# Patient Record
Sex: MALE | Race: WHITE | NOT HISPANIC OR LATINO | Employment: FULL TIME | URBAN - METROPOLITAN AREA
[De-identification: names, ages, dates, MRNs, and addresses within clinical notes are randomized per-mention and may not be internally consistent; named-entity substitution may affect disease eponyms.]

---

## 2024-05-01 ENCOUNTER — OFFICE VISIT (OUTPATIENT)
Dept: URGENT CARE | Facility: CLINIC | Age: 34
End: 2024-05-01

## 2024-05-01 VITALS
BODY MASS INDEX: 28.79 KG/M2 | RESPIRATION RATE: 18 BRPM | SYSTOLIC BLOOD PRESSURE: 121 MMHG | HEART RATE: 97 BPM | WEIGHT: 190 LBS | DIASTOLIC BLOOD PRESSURE: 76 MMHG | TEMPERATURE: 100 F | HEIGHT: 68 IN | OXYGEN SATURATION: 95 %

## 2024-05-01 DIAGNOSIS — R50.9 FEVER, UNSPECIFIED FEVER CAUSE: ICD-10-CM

## 2024-05-01 DIAGNOSIS — J01.90 ACUTE SINUSITIS WITH SYMPTOMS GREATER THAN 10 DAYS: Primary | ICD-10-CM

## 2024-05-01 DIAGNOSIS — R05.9 COUGH, UNSPECIFIED TYPE: ICD-10-CM

## 2024-05-01 LAB
CTP QC/QA: YES
SARS-COV-2 AG RESP QL IA.RAPID: NEGATIVE

## 2024-05-01 PROCEDURE — 99203 OFFICE O/P NEW LOW 30 MIN: CPT | Mod: S$GLB,,, | Performed by: NURSE PRACTITIONER

## 2024-05-01 PROCEDURE — 87811 SARS-COV-2 COVID19 W/OPTIC: CPT | Mod: QW,S$GLB,, | Performed by: NURSE PRACTITIONER

## 2024-05-01 RX ORDER — AZELASTINE 1 MG/ML
1 SPRAY, METERED NASAL 2 TIMES DAILY
Qty: 30 ML | Refills: 0 | Status: SHIPPED | OUTPATIENT
Start: 2024-05-01 | End: 2025-05-01

## 2024-05-01 RX ORDER — AMOXICILLIN AND CLAVULANATE POTASSIUM 875; 125 MG/1; MG/1
1 TABLET, FILM COATED ORAL 2 TIMES DAILY
Qty: 20 TABLET | Refills: 0 | Status: SHIPPED | OUTPATIENT
Start: 2024-05-01 | End: 2024-05-11

## 2024-05-01 RX ORDER — PROMETHAZINE HYDROCHLORIDE AND DEXTROMETHORPHAN HYDROBROMIDE 6.25; 15 MG/5ML; MG/5ML
5 SYRUP ORAL EVERY 8 HOURS PRN
Qty: 118 ML | Refills: 0 | Status: SHIPPED | OUTPATIENT
Start: 2024-05-01 | End: 2024-05-09

## 2024-05-01 RX ORDER — LEVOCETIRIZINE DIHYDROCHLORIDE 5 MG/1
5 TABLET, FILM COATED ORAL NIGHTLY
Qty: 30 TABLET | Refills: 11 | Status: SHIPPED | OUTPATIENT
Start: 2024-05-01 | End: 2025-05-01

## 2024-05-01 NOTE — PROGRESS NOTES
"Subjective:      Patient ID: True Garibay is a 34 y.o. male.    Vitals:  height is 5' 8" (1.727 m) and weight is 86.2 kg (190 lb). His temperature is 100 °F (37.8 °C). His blood pressure is 121/76 and his pulse is 97. His respiration is 18 and oxygen saturation is 95%.     Chief Complaint: Cough    35 y/o male presents with a complaint of body aches and fever cough, sinus pain, sinus pressure, fatigue, and nausea.  Reports he thought he was getting better, symptoms returned.  Onset of symptoms, two weeks..     Cough  This is a new problem. The current episode started 1 to 4 weeks ago. The problem has been gradually worsening. The problem occurs constantly. The cough is Non-productive. Associated symptoms include chills, ear congestion, a fever, headaches, nasal congestion, postnasal drip, a sore throat and sweats. Pertinent negatives include no chest pain, ear pain, heartburn, hemoptysis, myalgias, rash, rhinorrhea, shortness of breath, weight loss or wheezing. Nothing aggravates the symptoms. He has tried nothing for the symptoms. There is no history of asthma, bronchitis, environmental allergies or pneumonia.       Constitution: Positive for chills, fatigue and fever. Negative for sweating and generalized weakness.   HENT:  Positive for congestion, postnasal drip, sinus pain, sinus pressure and sore throat. Negative for ear pain, ear discharge, foreign body in ear, tinnitus, facial swelling and trouble swallowing.    Cardiovascular:  Negative for chest pain and palpitations.   Respiratory:  Positive for cough. Negative for chest tightness, bloody sputum, shortness of breath, stridor, wheezing and asthma.    Gastrointestinal:  Negative for nausea, vomiting and heartburn.   Musculoskeletal:  Negative for muscle ache.   Skin:  Negative for rash.   Allergic/Immunologic: Negative for environmental allergies and asthma.   Neurological:  Positive for headaches and history of migraines. Negative for dizziness and " light-headedness.      Objective:     Physical Exam   Constitutional: He is oriented to person, place, and time. He appears well-developed. He is cooperative.  Non-toxic appearance. He does not appear ill. No distress.   HENT:   Head: Normocephalic and atraumatic.   Ears:   Right Ear: Hearing, tympanic membrane, external ear and ear canal normal. no impacted cerumen  Left Ear: Hearing, tympanic membrane, external ear and ear canal normal. no impacted cerumen  Nose: Mucosal edema, rhinorrhea, sinus tenderness and congestion present. No nasal deformity. No epistaxis. Right sinus exhibits frontal sinus tenderness. Right sinus exhibits no maxillary sinus tenderness. Left sinus exhibits frontal sinus tenderness. Left sinus exhibits no maxillary sinus tenderness.   Mouth/Throat: Uvula is midline and mucous membranes are normal. No trismus in the jaw. Normal dentition. No uvula swelling. Posterior oropharyngeal erythema present. No oropharyngeal exudate, posterior oropharyngeal edema, tonsillar abscesses or cobblestoning. No tonsillar exudate.   Eyes: Conjunctivae and lids are normal. No scleral icterus.   Neck: Trachea normal and phonation normal. Neck supple. No edema present. No erythema present. No neck rigidity present.   Cardiovascular: Normal rate, regular rhythm, normal heart sounds and normal pulses.   Pulmonary/Chest: Effort normal and breath sounds normal. No respiratory distress. He has no decreased breath sounds. He has no rhonchi.   Abdominal: Normal appearance.   Musculoskeletal: Normal range of motion.         General: No deformity. Normal range of motion.   Neurological: He is alert and oriented to person, place, and time. He exhibits normal muscle tone. Coordination normal.   Skin: Skin is warm, dry, intact, not diaphoretic and not pale.   Psychiatric: His speech is normal and behavior is normal. Judgment and thought content normal.   Nursing note and vitals reviewed.      Assessment:     1. Acute  sinusitis with symptoms greater than 10 days    2. Fever, unspecified fever cause    3. Cough, unspecified type      Results for orders placed or performed in visit on 05/01/24   SARS Coronavirus 2 Antigen, POCT Manual Read   Result Value Ref Range    SARS Coronavirus 2 Antigen Negative Negative     Acceptable Yes         Plan:     Acute sinusitis with symptoms greater than 10 days  -     azelastine (ASTELIN) 137 mcg (0.1 %) nasal spray; 1 spray (137 mcg total) by Nasal route 2 (two) times daily.  Dispense: 30 mL; Refill: 0  -     levocetirizine (XYZAL) 5 MG tablet; Take 1 tablet (5 mg total) by mouth every evening.  Dispense: 30 tablet; Refill: 11  -     amoxicillin-clavulanate 875-125mg (AUGMENTIN) 875-125 mg per tablet; Take 1 tablet by mouth 2 (two) times daily. for 10 days  Dispense: 20 tablet; Refill: 0    Fever, unspecified fever cause  -     SARS Coronavirus 2 Antigen, POCT Manual Read    Cough, unspecified type  -     promethazine-dextromethorphan (PROMETHAZINE-DM) 6.25-15 mg/5 mL Syrp; Take 5 mLs by mouth every 8 (eight) hours as needed (cough).  Dispense: 118 mL; Refill: 0              You can try breathe right strips at night to help you breathe.  A cool mist humidifier in bedroom may help with cough and relieve stuffy nose.     Sore throat:  Lozenge, hard candy or honey.      Sinus rinses DO NOT USE TAP WATER, if you must, water must be a rolling boil for 1 minute, let it cool, then use.  May use distilled water, or over the counter nasal saline rinses.  Vics vapor rub in shower to help open nasal passages.  May use nasal gel to keep passages moisturized.  May use Nasal saline sprays during the day for added relief of congestion.   For those who go to the gym, please do not use the sauna or steam room now to clear sinuses.    During pollen season, change shirt if you are outside for a while when you go in.  Also wash your face.  Do not touch your face with your hands.  Wash your hands  often in general while ill, avoid face contact with hands.     Over the counter you can use Tylenol (acetominophen) or Ibuprofen for your minor aches and pains as long as you have no contraindications.    Good nutrition. Lots of rest. Plenty of fluids       You must understand that you've received an Urgent Care treatment only and that you may be released before all your medical problems are known or treated. You, the patient, will arrange for follow up care as instructed.  Follow up with your PCP or specialty clinic as directed in the next 1-2 weeks if not improved or as needed.  You can call [phone] to schedule an appointment with the appropriate provider.  If your condition worsens we recommend that you receive another evaluation at the emergency room immediately or contact your primary medical clinics after hours call service to discuss your concerns.  Please return here or go to the Emergency Department for any concerns or worsening of condition.    If you were prescribed a narcotic or controlled medication, do not drive or operate heavy equipment or machinery while taking these medications.    Thank you for choosing [facility]!

## 2024-05-02 NOTE — PATIENT INSTRUCTIONS
You can try breathe right strips at night to help you breathe.  A cool mist humidifier in bedroom may help with cough and relieve stuffy nose.     Sore throat:  Lozenge, hard candy or honey.      Sinus rinses DO NOT USE TAP WATER, if you must, water must be a rolling boil for 1 minute, let it cool, then use.  May use distilled water, or over the counter nasal saline rinses.  Vics vapor rub in shower to help open nasal passages.  May use nasal gel to keep passages moisturized.  May use Nasal saline sprays during the day for added relief of congestion.   For those who go to the gym, please do not use the sauna or steam room now to clear sinuses.    During pollen season, change shirt if you are outside for a while when you go in.  Also wash your face.  Do not touch your face with your hands.  Wash your hands often in general while ill, avoid face contact with hands.     Over the counter you can use Tylenol (acetominophen) or Ibuprofen for your minor aches and pains as long as you have no contraindications.    Good nutrition. Lots of rest. Plenty of fluids       You must understand that you've received an Urgent Care treatment only and that you may be released before all your medical problems are known or treated. You, the patient, will arrange for follow up care as instructed.  Follow up with your PCP or specialty clinic as directed in the next 1-2 weeks if not improved or as needed.  You can call [phone] to schedule an appointment with the appropriate provider.  If your condition worsens we recommend that you receive another evaluation at the emergency room immediately or contact your primary medical clinics after hours call service to discuss your concerns.  Please return here or go to the Emergency Department for any concerns or worsening of condition.    If you were prescribed a narcotic or controlled medication, do not drive or operate heavy equipment or machinery while taking these medications.    Thank  you for choosing [facility]!    Our goal in the Urgent Care is to always provide outstanding medical care. You may receive a survey by mail or e-mail in the next week regarding your experience today. We would greatly appreciate you completing and returning the survey. Your feedback provides us with a way to recognize our staff who provide very good care, and it helps us learn how to improve when your experience was below our aspiration of excellence.      We appreciate you trusting us with your medical care. We hope you feel better soon. We will be happy to take care of you for all of your future medical needs.   This note was prepared using voice-recognition software.  Although efforts are made to proofread the note, some errors may persist in the final document.     Sincerely,    Jhon Llamas DNP, FNP-C

## 2025-02-02 ENCOUNTER — OFFICE VISIT (OUTPATIENT)
Dept: URGENT CARE | Facility: CLINIC | Age: 35
End: 2025-02-02

## 2025-02-02 ENCOUNTER — HOSPITAL ENCOUNTER (EMERGENCY)
Facility: OTHER | Age: 35
Discharge: HOME | End: 2025-02-02
Attending: EMERGENCY MEDICINE

## 2025-02-02 VITALS
OXYGEN SATURATION: 98 % | RESPIRATION RATE: 19 BRPM | WEIGHT: 203.69 LBS | HEIGHT: 68 IN | SYSTOLIC BLOOD PRESSURE: 148 MMHG | HEART RATE: 76 BPM | BODY MASS INDEX: 30.87 KG/M2 | TEMPERATURE: 98 F | DIASTOLIC BLOOD PRESSURE: 90 MMHG

## 2025-02-02 VITALS
OXYGEN SATURATION: 96 % | HEART RATE: 105 BPM | DIASTOLIC BLOOD PRESSURE: 93 MMHG | SYSTOLIC BLOOD PRESSURE: 145 MMHG | HEIGHT: 68 IN | WEIGHT: 200 LBS | BODY MASS INDEX: 30.31 KG/M2 | TEMPERATURE: 98 F | RESPIRATION RATE: 20 BRPM
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
## 2025-02-02 PROCEDURE — 90471 IMMUNIZATION ADMIN: CPT | Performed by: NURSE PRACTITIONER

## 2025-02-02 PROCEDURE — 25000003 PHARM REV CODE 250: Performed by: NURSE PRACTITIONER

## 2025-02-02 PROCEDURE — 63600175 PHARM REV CODE 636 W HCPCS: Mod: JZ,TB | Performed by: NURSE PRACTITIONER

## 2025-02-02 PROCEDURE — 90471 IMMUNIZATION ADMIN: CPT | Mod: S$GLB,,, | Performed by: FAMILY MEDICINE

## 2025-02-02 PROCEDURE — 90675 RABIES VACCINE IM: CPT | Mod: JZ,TB | Performed by: NURSE PRACTITIONER

## 2025-02-02 PROCEDURE — 90375 RABIES IG IM/SC: CPT | Mod: JZ,TB | Performed by: NURSE PRACTITIONER

## 2025-02-02 PROCEDURE — 99284 EMERGENCY DEPT VISIT MOD MDM: CPT | Mod: 25

## 2025-02-02 PROCEDURE — 90715 TDAP VACCINE 7 YRS/> IM: CPT | Mod: S$GLB,,, | Performed by: FAMILY MEDICINE

## 2025-02-02 PROCEDURE — 99213 OFFICE O/P EST LOW 20 MIN: CPT | Mod: 25,S$GLB,, | Performed by: FAMILY MEDICINE

## 2025-02-02 RX ORDER — ONDANSETRON 4 MG/1
4 TABLET, ORALLY DISINTEGRATING ORAL EVERY 8 HOURS PRN
Qty: 15 TABLET | Refills: 0 | Status: SHIPPED | OUTPATIENT
Start: 2025-02-02 | End: 2025-02-07

## 2025-02-02 RX ORDER — AMOXICILLIN AND CLAVULANATE POTASSIUM 875; 125 MG/1; MG/1
1 TABLET, FILM COATED ORAL 2 TIMES DAILY
Qty: 14 TABLET | Refills: 0 | Status: SHIPPED | OUTPATIENT
Start: 2025-02-02 | End: 2025-02-09

## 2025-02-02 RX ORDER — ONDANSETRON 4 MG/1
4 TABLET, ORALLY DISINTEGRATING ORAL
Status: COMPLETED | OUTPATIENT
Start: 2025-02-02 | End: 2025-02-02

## 2025-02-02 RX ADMIN — RABIES IMMUNE GLOBULIN (HUMAN) 1800 UNITS: 300 INJECTION, SOLUTION INFILTRATION; INTRAMUSCULAR at 01:02

## 2025-02-02 RX ADMIN — RABIES VACCINE 2.5 UNITS: KIT at 01:02

## 2025-02-02 RX ADMIN — ONDANSETRON 4 MG: 4 TABLET, ORALLY DISINTEGRATING ORAL at 01:02

## 2025-02-02 NOTE — ED TRIAGE NOTES
Pt arrived to ED complaining of cat bites and scratches to L wrist forearm and thumb last night. Minor scratches noted to wrist and thumb.

## 2025-02-02 NOTE — ED PROVIDER NOTES
"Source of History:  Patient, Chart    Chief complaint:  Animal Bite (Pt reports multiple bites to L wrist/forearm and L thumb from stray cat last night. Pt seen at  and sent to ED for rabies vaccine)      HPI:  True Garibay is a 34 y.o. male with no PMH. He was sent to the ED from Urgent Care for a rabies vaccine. Last night he was bitten and scratched multiple times on his right wrist and right thumb by a stray cat.  He was superficial scratch/bite marks with no edema, erythema, or drainage. He has no pain. He received the tetanus vaccine at .     This is the extent to the patients complaints today here in the emergency department.    ROS: As per HPI and below:  Constitutional: No fever.  No chills.  Eyes: No visual changes.   ENT: No sore throat. No ear pain.  Urinary: No abnormal urination.  MSK: No back pain. No joint pain.   Integument:  Positive for lacerations    Review of patient's allergies indicates:  No Known Allergies    PMH:  As per HPI and below:  History reviewed. No pertinent past medical history.  History reviewed. No pertinent surgical history.    Social History     Tobacco Use    Smoking status: Every Day     Current packs/day: 1.00     Average packs/day: 1 pack/day for 15.1 years (15.1 ttl pk-yrs)     Types: Cigarettes     Start date: 2010    Smokeless tobacco: Never   Substance Use Topics    Alcohol use: Yes    Drug use: Never       Physical Exam:    BP (!) 145/93   Pulse 105   Temp 98.3 °F (36.8 °C) (Oral)   Resp 20   Ht 5' 8" (1.727 m)   Wt 90.7 kg (200 lb)   SpO2 96%   BMI 30.41 kg/m²   Nursing note and vital signs reviewed.  Constitutional: No acute distress.  Nontoxic  Eyes: No conjunctival injection.  Extraocular muscles are intact.  ENT: Normal phonation.  Musculoskeletal: Good range of motion all joints.  No deformities.  Neck supple.  No meningismus. Neurovascularly intact.  Skin:  right thumb and right wrist superficial scratch/bite marks.  no edema, erythema, or " drainage  Psych: Appropriate, conversant.    [facility]    Emergent evaluation of a 33 yo male presenting for a post exposure prophylactic vaccine.  Patient states that he was bit and scratched by a stray cat last night.  Patient went to urgent care and was started on Augmentin as well as given a tetanus shot.  Patient states that he was advised to come to the ED for evaluation and possible rabies vaccines.  Patient states he was online Googling all night and wants to vaccines despite research showing that low risk for rabies in domestic animals.  Patient states I notes over kill but I wanted any way.  On exam pt is A&Ox3. VSS. Nonfebrile and nontoxic appearing. Patient is not having any pain.   Superficial lacerations/abrasions noted to right thumb and wrist.  No redness, erythema or drainage noted.    History Acquisition   Additional history was acquired from other historians.  Chart    The patient's list of active medical problems, social history, medications, and allergies as documented per RN staff has been reviewed.     Differential Diagnoses   Based on available information and the initial assessment, the working differential diagnoses considered during this evaluation include but are not limited to laceration, contusion, scratches, cat bite, rabies exposure, others.    I will give rabies injections and reassess.      LABS   Labs Reviewed - No data to display    Additional Consideration   All available testing was considered during the course of this workup.  Comorbidities taken into consideration during the patient's evaluation and treatment include weight, age.    Social determinants of health were taken into consideration during development of our treatment plan.    Medications   rabies vaccine, PCEC injection 2.5 Units (has no administration in time range)   rabies immune globulin (PF) (HYPERRAB) injection 1,800 Units (has no administration in time range)   ondansetron disintegrating tablet 4 mg (has no  administration in time range)      ED Course as of 02/02/25 1308   Sun Feb 02, 2025   1307 Patient given rabies immuno globulin into 5 separate superficial laceration sites.  Remaining medications have been given IM.  Patient also received his 1st IM injection of rabies vaccine.  Advised to follow up on day 3, 7 and 14 for repeat imaging.  Will attempt to have patient followed at infusion center but patient advised if he is unable to return to the ED. advised to rotate Tylenol and ibuprofen as needed for pain.  Take antibiotics as prescribed.  Zofran for nausea.  Keep area clean and dry and monitor for any signs of infection.  Return precautions discussed.  Patient verbalized understanding of this plan of care.  All questions and concerns addressed. [RZ]      ED Course User Index  [RZ] Barbara Oconnor NP             CLINICAL IMPRESSION  1. Cat bite, initial encounter         ED Disposition Condition    Discharge Stable            Instruction:  I see no indication of an emergent process beyond that addressed during our encounter but have duly counseled the patient/family regarding the need for prompt follow-up as well as the indications that should prompt immediate return to the emergency room should new or worrisome developments occur.  The patient/family has been provided with verbal and printed direction regarding our final diagnosis(es) as well as instructions regarding use of OTC and/or Rx medications intended to manage the patient's aforementioned conditions including:  ED Prescriptions       Medication Sig Dispense Start Date End Date Auth. Provider    ondansetron (ZOFRAN-ODT) 4 MG TbDL Take 1 tablet (4 mg total) by mouth every 8 (eight) hours as needed (nausea). 15 tablet 2/2/2025 2/7/2025 Barbara Oconnor NP          Patient has been advised of following recommended follow-up instructions:  Follow-up Information       Follow up With Specialties Details Why Contact Info    PCP  Schedule an appointment as  soon as possible for a visit  As needed     [facility]  Call  to schedule outpatient follow up [address] [facility]          The patient/family communicates understanding of all this information and all remaining questions and concerns were addressed at this time.      The patient's condition did not warrant review of the  and prescription of controlled substances.      This note was created using dictation software.  This program may occasionally mistype words and phrases.        Barbara Oconnor, NP  02/02/25 [id number]

## 2025-02-02 NOTE — DISCHARGE INSTRUCTIONS
Please return to the ED on 2/5/25, 2/9/25 and 2/16/25 for your repeat rabies vaccines if you marianne unable to obtain them at the infusion center.      Please take your antibiotics as prescribed.  Make sure you eat when you take your medications.  We have prescribed you Zofran for nausea.  Rotate Tylenol and ibuprofen as needed for pain.  Monitor for any signs of infection.    Our goal in the emergency department is to always give you outstanding care and exceptional service. You may receive a survey by mail or e-mail in the next week regarding your experience in our ED. We would greatly appreciate your completing and returning the survey. Your feedback provides us with a way to recognize our staff who give very good care and it helps us learn how to improve when your experience was below our aspiration of excellence.

## 2025-02-02 NOTE — PROGRESS NOTES
"Subjective:      Patient ID: True Garibay is a 34 y.o. male.    Vitals:  height is 5' 8" (1.727 m) and weight is 92.4 kg (203 lb 11.3 oz). His oral temperature is 97.7 °F (36.5 °C). His blood pressure is 148/90 (abnormal) and his pulse is 76. His respiration is 19 and oxygen saturation is 98%.     Chief Complaint: Animal Bite    Patient presents with cat bite.  States he was bitten by a stray cat last night.  Last tetanus vaccination unknown.    Animal Bite   The incident occurred yesterday. The incident occurred in the street. He came to the ER via personal transport. There is an injury to the Right wrist and right hand. There is an injury to the Right thumb. The pain is moderate. He is Right-handed. His tetanus status is out of date (NEVER HAD ONE).     ROS   Objective:     Physical Exam   Constitutional: He is oriented to person, place, and time. He appears well-developed.   HENT:   Head: Normocephalic and atraumatic. Head is without abrasion, without contusion and without laceration.   Ears:   Right Ear: External ear normal.   Left Ear: External ear normal.   Nose: Nose normal.   Mouth/Throat: Oropharynx is clear and moist and mucous membranes are normal.   Eyes: Conjunctivae, EOM and lids are normal. Pupils are equal, round, and reactive to light.   Neck: Trachea normal and phonation normal. Neck supple.   Cardiovascular: Normal rate, regular rhythm and normal heart sounds.   No murmur heard.  Pulmonary/Chest: Effort normal and breath sounds normal. No stridor. No respiratory distress. He has no wheezes. He has no rhonchi. He has no rales.   Abdominal: He exhibits no distension. There is no abdominal tenderness.   Musculoskeletal: Normal range of motion.         General: Normal range of motion.   Neurological: He is alert and oriented to person, place, and time.   Skin: Skin is warm, dry and intact. Capillary refill takes less than 2 seconds. No abrasion, No burn and No ecchymosis         Comments:   Right " hand/forearm:  Positive minor abrasions on forearm and hand.  No open wounds found.  Full ROM.  Neurovascular intact.    Area cleaned with Betadine and alcohol.  Band-Aids applied.   Psychiatric: His speech is normal and behavior is normal. Judgment and thought content normal.   Nursing note and vitals reviewed.      Assessment:     1. Cat bite, initial encounter        Plan:   Discussed exam findings/diagnosis/plan with patient.  Tdap given.  Rabies vaccination discussed considering the stray cat. Pt would like to get it from [facility].  Advised to f/u with PCP within 2-5 days. All questions answered. Patient verbally understood and agreed with treatment plan.  Educational materials and instructions regarding the visit diagnosis and management provided.     Cat bite, initial encounter  -     Tdap vaccine injection 0.5 mL    Other orders  -     amoxicillin-clavulanate 875-125mg (AUGMENTIN) 875-125 mg per tablet; Take 1 tablet by mouth 2 (two) times daily. for 7 days  Dispense: 14 tablet; Refill: 0

## 2025-02-06 ENCOUNTER — HOSPITAL ENCOUNTER (EMERGENCY)
Facility: OTHER | Age: 35
Discharge: HOME | End: 2025-02-06
Attending: EMERGENCY MEDICINE

## 2025-02-06 ENCOUNTER — TELEPHONE (OUTPATIENT)
Dept: INFECTIOUS DISEASES | Facility: CLINIC | Age: 35
End: 2025-02-06

## 2025-02-06 VITALS
DIASTOLIC BLOOD PRESSURE: 84 MMHG | WEIGHT: 200 LBS | BODY MASS INDEX: 30.31 KG/M2 | OXYGEN SATURATION: 96 % | RESPIRATION RATE: 18 BRPM | HEART RATE: 109 BPM | HEIGHT: 68 IN | SYSTOLIC BLOOD PRESSURE: 140 MMHG | TEMPERATURE: 99 F

## 2025-02-06 DIAGNOSIS — S61.452A CAT BITE OF HAND, LEFT, INITIAL ENCOUNTER: Primary | ICD-10-CM
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
## 2025-02-06 DIAGNOSIS — Z23 ENCOUNTER FOR REPEAT ADMINISTRATION OF RABIES VACCINATION: ICD-10-CM

## 2025-02-06 PROCEDURE — 90471 IMMUNIZATION ADMIN: CPT | Performed by: NURSE PRACTITIONER

## 2025-02-06 PROCEDURE — 90675 RABIES VACCINE IM: CPT | Mod: JZ,TB | Performed by: NURSE PRACTITIONER

## 2025-02-06 PROCEDURE — 63600175 PHARM REV CODE 636 W HCPCS: Mod: JZ,TB | Performed by: NURSE PRACTITIONER

## 2025-02-06 PROCEDURE — 99999 HC NO LEVEL OF SERVICE - ED ONLY: CPT

## 2025-02-06 RX ADMIN — RABIES VACCINE 2.5 UNITS: KIT at 02:02

## 2025-02-06 NOTE — ED TRIAGE NOTES
Pt. Is a 34 yr. Old male. Pt. Is here to receive second dose of Rabies immunization. Pt. First dose was Sunday. Pt. Is alert and ABC's are intact. Pt. Has no other complaints.

## 2025-02-06 NOTE — ED NOTES
Spoke w/ ID clinic philip Valenzuela for remaining Rabies vaccine scheduled ( 2/13 @10AM, 2/14 @10AM, 2/21 @ 1PM).  Patient advised of date/time and clinic location.  Patient expressed verbal understanding and does not have questions at this time.

## 2025-02-06 NOTE — ED PROVIDER NOTES
"Source of History:  Patient, chart    Chief complaint:  Immunizations (Pt here for second dose of rabies vaccine. Pt states first dose Sunday. )      HPI:  True Garibay is a 34 y.o. male with no PMH presenting for second rabies vaccine.  Pt states he was bit by a cat on Saturday.  Patient was seen at urgent care and sent to the ED for rabies vaccination.  Patient was updated that they are not necessary at this time but patient was adamant he wanted them.  Patient is here for 2nd immunization.  Patient denies any complaints since last visit.     This is the extent to the patients complaints today here in the emergency department.    ROS: As per HPI and below:  Constitutional: No fever.  No chills.  Eyes: No visual changes.   ENT: No sore throat. No ear pain.  Urinary: No abnormal urination.  MSK: No back pain. No joint pain.   Integument: Positive for cat bites    Review of patient's allergies indicates:  No Known Allergies    PMH:  As per HPI and below:  No past medical history on file.  No past surgical history on file.    Social History     Tobacco Use    Smoking status: Every Day     Current packs/day: 1.00     Average packs/day: 1 pack/day for 15.1 years (15.1 ttl pk-yrs)     Types: Cigarettes     Start date: 2010    Smokeless tobacco: Never   Substance Use Topics    Alcohol use: Yes    Drug use: Never       Physical Exam:    BP (!) 140/84 (BP Location: Left arm)   Pulse 109   Temp 98.8 °F (37.1 °C) (Oral)   Resp 18   Ht 5' 8" (1.727 m)   Wt 90.7 kg (200 lb)   SpO2 96%   BMI 30.41 kg/m²   Nursing note and vital signs reviewed.  Constitutional: No acute distress.  Nontoxic  Eyes: No conjunctival injection. Extraocular muscles intact.  ENT: Normal phonation.  Musculoskeletal: Good range of motion all joints.  No deformities.  Neck supple.  No meningismus. Neurovascularly intact.  Skin:  Healed cat bites noted to right forearm.  No redness, erythema or drainage noted.  No rashes seen.  Good turgor.  No " abrasions.  No ecchymoses.  Psych:  Alert and oriented x 3.  Appropriate, conversant.    MDM    Emergent evaluation of a 35 yo male presenting for rabies vaccinations.  Patient was bit by a cat on Saturday and was here for his 1st rabies vaccine on Sunday.  Patient was unable to get follow up in the outpatient clinic so he came back to the ED for his 2nd round of shots.  Patient denies any complaints.  On exam pt is A&Ox3. VSS. Nonfebrile and nontoxic appearing.  Mucous membranes pink and moist. Pt speaking in full sentences. Healed cat bites noted to right forearm.  No redness, erythema or drainage noted.  Steady gait appreciated. Cap refill < 3 seconds.      History Acquisition   Additional history was acquired from other historians.  Chart    The patient's list of active medical problems, social history, medications, and allergies as documented per RN staff has been reviewed.     Differential Diagnoses   Based on available information and the initial assessment, the working differential diagnoses considered during this evaluation include but are not limited to rabies vaccine, cellulitis, infection, others.    I will give 2nd vaccine and discharged home.      Additional Consideration   All available testing was considered during the course of this workup.  Comorbidities taken into consideration during the patient's evaluation and treatment include weight, age.    Social determinants of health were taken into consideration during development of our treatment plan.    Medications   rabies vaccine, PCEC injection 2.5 Units (2.5 Units Intramuscular Given 2/6/25 1435)      ED Course as of 02/06/25 1441   Thu Feb 06, 2025   1420 Rabies immunizations given.  Referral for infectious disease placed.  Patient advised to return to the ED for 3rd shot if unable to follow up at a at Infectious Disease.  Patient verbalized understanding of this plan of care.  All questions and concerns addressed. [RZ]   1217 Patient is  hemodynamically stable, vital signs are normal. Discharge instructions given. Return to ED precautions discussed. Follow up as directed. Pt verbalized understanding of this plan.  Pt is stable for discharge.  [RZ]      ED Course User Index  [RZ] Barbara Oconnor NP             CLINICAL IMPRESSION  1. Cat bite, subsequent encounter    2. Encounter for repeat administration of rabies vaccination         ED Disposition Condition    Discharge Stable            Instruction:  I see no indication of an emergent process beyond that addressed during our encounter but have duly counseled the patient/family regarding the need for prompt follow-up as well as the indications that should prompt immediate return to the emergency room should new or worrisome developments occur.  The patient/family has been provided with verbal and printed direction regarding our final diagnosis(es) as well as instructions regarding use of OTC and/or Rx medications intended to manage the patient's aforementioned conditions including:  ED Prescriptions    None       Patient has been advised of following recommended follow-up instructions:  Follow-up Information       Follow up With Specialties Details Why Contact Info    [facility] INFECTIOUS DISE[facility] Infectious Diseases Schedule an appointment as soon as possible for a visit  for repeat shots [address] [place] [id number] [phone]          The patient/family communicates understanding of all this information and all remaining questions and concerns were addressed at this time.      The patient's condition did not warrant review of the  and prescription of controlled substances.      This note was created using dictation software.  This program may occasionally mistype words and phrases.         Barbara Oconnor, JAMSHID  02/06/25 1440       Barbara Oconnor NP  02/06/25 1441

## 2025-02-10 ENCOUNTER — CLINICAL SUPPORT (OUTPATIENT)
Dept: INFECTIOUS DISEASES | Facility: CLINIC | Age: 35
End: 2025-02-10
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
## 2025-02-10 PROCEDURE — 90471 IMMUNIZATION ADMIN: CPT | Mod: PBBFAC

## 2025-02-10 PROCEDURE — 90675 RABIES VACCINE IM: CPT | Mod: PBBFAC,TB

## 2025-02-10 PROCEDURE — 99999 PR PBB SHADOW E&M-EST. PATIENT-LVL II: CPT | Mod: PBBFAC,,,

## 2025-02-10 PROCEDURE — 99212 OFFICE O/P EST SF 10 MIN: CPT | Mod: PBBFAC

## 2025-02-10 PROCEDURE — 99999PBSHW PR PBB SHADOW TECHNICAL ONLY FILED TO HB: Mod: PBBFAC,,,

## 2025-02-10 RX ADMIN — RABIES VACCINE 2.5 UNITS: KIT at 10:02

## 2025-02-10 NOTE — PROGRESS NOTES
Pt states he has a knot in the posterior aspect of his left upper arm. Pt denies pain, redness, swelling or discomfort at the site. Pt was instructed to f/u up with PCP regarding this complaint. Pt verbalized understanding. Pt received Rabies Vaccine IM to left deltoid. Pt tolerated injection well and departed from clinic in [facility].

## 2025-02-14 ENCOUNTER — PATIENT MESSAGE (OUTPATIENT)
Dept: INFECTIOUS DISEASES | Facility: CLINIC | Age: 35
End: 2025-02-14

## 2025-02-17 ENCOUNTER — CLINICAL SUPPORT (OUTPATIENT)
Dept: INFECTIOUS DISEASES | Facility: CLINIC | Age: 35
End: 2025-02-17
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
## 2025-02-17 PROCEDURE — 99211 OFF/OP EST MAY X REQ PHY/QHP: CPT | Mod: PBBFAC

## 2025-02-17 PROCEDURE — 90675 RABIES VACCINE IM: CPT | Mod: PBBFAC,TB

## 2025-02-17 PROCEDURE — 90471 IMMUNIZATION ADMIN: CPT | Mod: PBBFAC

## 2025-02-17 RX ADMIN — RABIES VACCINE 2.5 UNITS: KIT at 09:02

## 2025-02-17 NOTE — PROGRESS NOTES
Pt received Rabies vaccine IM to left deltoid, pt tolerated injection well and departed from clinic in [facility].     83.9